# Patient Record
(demographics unavailable — no encounter records)

---

## 2025-04-08 NOTE — PROCEDURE
[de-identified] :   Indication: *** Procedure: Flexible fiberoptic laryngoscopy. Verbal consent was performed including discussion of risks, benefits, alternatives and answering questions to patient's/parent's satisfaction. The nares were topicalized with 2 sprays of lidocaine 2% and oxymetazoline to bilateral nares. The flexible scope was passed through the *** naris. Sequential examination of upper airway anatomical sites performed including: nasal cavity, nasopharynx, oropharynx, hypopharynx, larynx and subglottic triangle. Photo documentation was obtained.   Findings: Nasal cavity: *** edema, no masses, no bleeding, osteomeatal complex clear, sphenoethmoid recess clear.  Choana: adenoid size: ***. Oropharynx: bilateral tonsil size: ***.  Posterior pharyngeal wall: *** post-nasal drip. Vallecula: clear Pyriform sinuses: clear bilaterally. Larynx:  Epiglottis: sharp margins, no edema.  Arytenoid: no prolapse, no edema.  Aryepiglottic folds: no shortening.  False vocal folds: no edema or lesions; not obstructing view of true vocal folds on phonation.  True vocal folds: no edema or lesions.  Vocal Fold Mobility: fully mobile bilaterally.  Subglottic triangle: patent Scope consistent with ***. The patient tolerated the procedure well without complications.

## 2025-04-08 NOTE — PHYSICAL EXAM
[de-identified] : edematous [Midline] : trachea located in midline position [Laryngoscopy Performed] : laryngoscopy was performed, see procedure section for findings [de-identified] : size 3 [Normal] : palpation of lymph nodes is normal

## 2025-04-08 NOTE — PROCEDURE
[de-identified] :   Indication: *** Procedure: Flexible fiberoptic laryngoscopy. Verbal consent was performed including discussion of risks, benefits, alternatives and answering questions to patient's/parent's satisfaction. The nares were topicalized with 2 sprays of lidocaine 2% and oxymetazoline to bilateral nares. The flexible scope was passed through the *** naris. Sequential examination of upper airway anatomical sites performed including: nasal cavity, nasopharynx, oropharynx, hypopharynx, larynx and subglottic triangle. Photo documentation was obtained.   Findings: Nasal cavity: *** edema, no masses, no bleeding, osteomeatal complex clear, sphenoethmoid recess clear.  Choana: adenoid size: ***. Oropharynx: bilateral tonsil size: ***.  Posterior pharyngeal wall: *** post-nasal drip. Vallecula: clear Pyriform sinuses: clear bilaterally. Larynx:  Epiglottis: sharp margins, no edema.  Arytenoid: no prolapse, no edema.  Aryepiglottic folds: no shortening.  False vocal folds: no edema or lesions; not obstructing view of true vocal folds on phonation.  True vocal folds: no edema or lesions.  Vocal Fold Mobility: fully mobile bilaterally.  Subglottic triangle: patent Scope consistent with ***. The patient tolerated the procedure well without complications.

## 2025-04-08 NOTE — ASSESSMENT
[FreeTextEntry1] : Sleep disordered breathing, adenotonsillar hypertrophy.  Recommend flonase sensimist trial.  Recommend referral to pediatric allergy.   Total time spent on patient encounter including review of patient's medical history, physical examination, interpretation of any indicated labs / imaging and counseling, excluding time spent performing any indicated procedures: 49 minutes.    Alfredo Juan MD, MPH Director of Pediatric Otolaryngology [facility] / [facility]

## 2025-04-08 NOTE — PHYSICAL EXAM
[de-identified] : edematous [Midline] : trachea located in midline position [Laryngoscopy Performed] : laryngoscopy was performed, see procedure section for findings [de-identified] : size 3 [Normal] : palpation of lymph nodes is normal

## 2025-04-08 NOTE — ASSESSMENT
[FreeTextEntry1] : Sleep disordered breathing, adenotonsillar hypertrophy.  Recommend flonase sensimist trial.  Recommend referral to pediatric allergy.   Total time spent on patient encounter including review of patient's medical history, physical examination, interpretation of any indicated labs / imaging and counseling, excluding time spent performing any indicated procedures: 49 minutes.    Alfredo Juan MD, MPH Director of Pediatric Otolaryngology [facility] / [facility]

## 2025-06-23 NOTE — HISTORY OF PRESENT ILLNESS
[None] : The patient is currently asymptomatic [de-identified] : CHRISTINE NESBITT is a 3 year yo female who since about age 1 was diagnosed with post viral asthma, she would get consistent deep cough after every cold. Parents have managed with nebulizer treatments at home, not needing ER visits.  She also has a history of seasonal nasal allergy symptoms, itchy eyes, itchy nose, congestion, runny nose and sneezing. She snores to the point where her parents can hear he every night. She has seen Dr. Juan who suggested possible adenoidectomy and allergy evaluation.

## 2025-06-23 NOTE — SOCIAL HISTORY
[House] : [unfilled] lives in a house  [Central Forced Air] : heating provided by central forced air [Central] : air conditioning provided by central unit [Dry] : dry [Cat] : cat [Single] : single [Humidifier] : does not use a humidifier [Dehumidifier] : does not use a dehumidifier [Cockroaches] : Patient states that there are no cockroaches in the home [Dust Mite Covers] : does not have dust mite covers [Feather Pillows] : does not have feather pillows [Feather Comforter] : does not have a feather comforter [Bedroom] : not in the bedroom [Basement] : not in the basement [Living Area] : not in the living area [Smokers in Household] : there are no smokers in the home

## 2025-06-23 NOTE — REVIEW OF SYSTEMS
[Rhinorrhea] : rhinorrhea [Nasal Congestion] : nasal congestion [Snoring] : snoring [Post Nasal Drip] : post nasal drip [Sneezing] : sneezing [Cough] : cough [Congested In The Chest] : feeling ~L congested in the chest [Wheezing] : wheezing [Nl] : Genitourinary

## 2025-06-23 NOTE — ASSESSMENT
[FreeTextEntry1] : 1. ?AR - Continue Flonase sensamist per ENT - immunocap to environmental allergens - azelastine ophthalmic, cetirizine 5mg  prn  2. RAD - albuterol pulm eval if it continues.